# Patient Record
Sex: FEMALE
[De-identification: names, ages, dates, MRNs, and addresses within clinical notes are randomized per-mention and may not be internally consistent; named-entity substitution may affect disease eponyms.]

---

## 2020-10-01 ENCOUNTER — APPOINTMENT (OUTPATIENT)
Dept: OBGYN | Facility: CLINIC | Age: 45
End: 2020-10-01
Payer: MEDICAID

## 2020-10-01 ENCOUNTER — TRANSCRIPTION ENCOUNTER (OUTPATIENT)
Age: 45
End: 2020-10-01

## 2020-10-01 VITALS
DIASTOLIC BLOOD PRESSURE: 80 MMHG | WEIGHT: 133.44 LBS | BODY MASS INDEX: 24.56 KG/M2 | HEIGHT: 62 IN | SYSTOLIC BLOOD PRESSURE: 120 MMHG | TEMPERATURE: 97.6 F

## 2020-10-01 DIAGNOSIS — R61 GENERALIZED HYPERHIDROSIS: ICD-10-CM

## 2020-10-01 PROCEDURE — 36415 COLL VENOUS BLD VENIPUNCTURE: CPT

## 2020-10-01 PROCEDURE — 99386 PREV VISIT NEW AGE 40-64: CPT

## 2020-10-01 NOTE — PHYSICAL EXAM
[Appropriately responsive] : appropriately responsive [Alert] : alert [No Acute Distress] : no acute distress [No Lymphadenopathy] : no lymphadenopathy [Soft] : soft [Non-tender] : non-tender [Non-distended] : non-distended [No HSM] : No HSM [No Lesions] : no lesions [No Mass] : no mass [Oriented x3] : oriented x3 [Examination Of The Breasts] : a normal appearance [No Discharge] : no discharge [No Masses] : no breast masses were palpable [Labia Majora] : normal [Labia Minora] : normal [Normal] : normal [Uterine Adnexae] : normal [FreeTextEntry4] : no abnormal discharge

## 2020-10-01 NOTE — DISCUSSION/SUMMARY
[FreeTextEntry1] : 46 yo for well woman exam. h/o recent night sweats and vaginal odor.\par 1) Health maintenance: \par Pap + HPV\par STI testing\par Mammogram referral given\par 2) Night sweats: possible perimenopausal symptoms, recommend day 3 FSH to evaluate further\par 3) Vaginal odor: \par Affirm test, will contact with results\par

## 2020-10-01 NOTE — HISTORY OF PRESENT ILLNESS
[Patient reported mammogram was normal] : Patient reported mammogram was normal [Patient reported PAP Smear was normal] : Patient reported PAP Smear was normal [Y] : Patient is sexually active [N] : Patient denies prior pregnancies [Mammogramdate] : 2018 [PapSmeardate] : 2018  [PGHxTotal] : 0 [Regular Cycle Intervals] : periods have been regular [FreeTextEntry1] : 9/27/20 [Currently Active] : currently active [No] : No

## 2020-10-02 LAB
HIV1+2 AB SPEC QL IA.RAPID: NONREACTIVE
T PALLIDUM AB SER QL IA: NEGATIVE

## 2020-10-05 LAB
C TRACH RRNA SPEC QL NAA+PROBE: NOT DETECTED
CANDIDA VAG CYTO: NOT DETECTED
G VAGINALIS+PREV SP MTYP VAG QL MICRO: DETECTED
HBV SURFACE AG SER QL: NONREACTIVE
HCV AB SER QL: NONREACTIVE
HCV S/CO RATIO: 0.09 S/CO
HPV HIGH+LOW RISK DNA PNL CVX: NOT DETECTED
N GONORRHOEA RRNA SPEC QL NAA+PROBE: NOT DETECTED
SOURCE TP AMPLIFICATION: NORMAL
T VAGINALIS VAG QL WET PREP: NOT DETECTED

## 2020-10-08 LAB — CYTOLOGY CVX/VAG DOC THIN PREP: NORMAL

## 2020-10-19 DIAGNOSIS — N97.9 FEMALE INFERTILITY, UNSPECIFIED: ICD-10-CM

## 2020-10-23 LAB
ESTRADIOL SERPL-MCNC: 173 PG/ML
FSH SERPL-MCNC: 3.5 IU/L

## 2020-11-24 ENCOUNTER — RX RENEWAL (OUTPATIENT)
Age: 45
End: 2020-11-24

## 2020-12-21 ENCOUNTER — TRANSCRIPTION ENCOUNTER (OUTPATIENT)
Age: 45
End: 2020-12-21

## 2021-04-26 ENCOUNTER — APPOINTMENT (OUTPATIENT)
Dept: OBGYN | Facility: CLINIC | Age: 46
End: 2021-04-26
Payer: MEDICAID

## 2021-04-26 DIAGNOSIS — N89.8 OTHER SPECIFIED NONINFLAMMATORY DISORDERS OF VAGINA: ICD-10-CM

## 2021-04-26 PROCEDURE — 99213 OFFICE O/P EST LOW 20 MIN: CPT | Mod: 95

## 2021-04-26 NOTE — HISTORY OF PRESENT ILLNESS
[FreeTextEntry1] : Televisit today. Pt c/o vaginal odor and intermittent vaginal itching, symptoms similar to when she had BV. Denies vaginal discharge or vaginal burning. Denies urinary symptoms. No change in sexual partners.

## 2021-04-26 NOTE — DISCUSSION/SUMMARY
[FreeTextEntry1] : 46 yo with vaginal odor and itching. \par -Treat for presumed BV, pt would like metrogel, Rx sent to pharmacy, instructed on use\par -Discussed need to return for in-person visit if no improvement in symptoms for further evaluation\par -All questions answered

## 2021-08-06 ENCOUNTER — TRANSCRIPTION ENCOUNTER (OUTPATIENT)
Age: 46
End: 2021-08-06

## 2021-10-28 ENCOUNTER — APPOINTMENT (OUTPATIENT)
Dept: OBGYN | Facility: CLINIC | Age: 46
End: 2021-10-28
Payer: MEDICAID

## 2021-10-28 VITALS
BODY MASS INDEX: 24.48 KG/M2 | SYSTOLIC BLOOD PRESSURE: 118 MMHG | WEIGHT: 133 LBS | RESPIRATION RATE: 16 BRPM | HEIGHT: 62 IN | TEMPERATURE: 96.6 F | DIASTOLIC BLOOD PRESSURE: 78 MMHG

## 2021-10-28 DIAGNOSIS — Z12.39 ENCOUNTER FOR OTHER SCREENING FOR MALIGNANT NEOPLASM OF BREAST: ICD-10-CM

## 2021-10-28 DIAGNOSIS — N89.8 OTHER SPECIFIED NONINFLAMMATORY DISORDERS OF VAGINA: ICD-10-CM

## 2021-10-28 DIAGNOSIS — Z01.419 ENCOUNTER FOR GYNECOLOGICAL EXAMINATION (GENERAL) (ROUTINE) W/OUT ABNORMAL FINDINGS: ICD-10-CM

## 2021-10-28 DIAGNOSIS — L65.9 NONSCARRING HAIR LOSS, UNSPECIFIED: ICD-10-CM

## 2021-10-28 PROCEDURE — 99396 PREV VISIT EST AGE 40-64: CPT

## 2021-10-28 RX ORDER — METRONIDAZOLE 7.5 MG/G
0.75 GEL VAGINAL
Qty: 1 | Refills: 0 | Status: DISCONTINUED | COMMUNITY
Start: 2020-12-21 | End: 2021-10-28

## 2021-10-28 RX ORDER — METRONIDAZOLE 500 MG/1
500 TABLET ORAL TWICE DAILY
Qty: 14 | Refills: 0 | Status: DISCONTINUED | COMMUNITY
Start: 2020-10-05 | End: 2021-10-28

## 2021-10-28 NOTE — PHYSICAL EXAM
[Appropriately responsive] : appropriately responsive [Alert] : alert [No Acute Distress] : no acute distress [No Lymphadenopathy] : no lymphadenopathy [Soft] : soft [Non-tender] : non-tender [Non-distended] : non-distended [No HSM] : No HSM [No Lesions] : no lesions [No Mass] : no mass [Oriented x3] : oriented x3 [Examination Of The Breasts] : a normal appearance [No Discharge] : no discharge [No Masses] : no breast masses were palpable [Labia Majora] : normal [Labia Minora] : normal [Normal] : normal [Uterine Adnexae] : normal [Tenderness] : nontender [Enlarged ___ wks] : not enlarged [FreeTextEntry4] : small amount thin white discharge

## 2021-10-28 NOTE — DISCUSSION/SUMMARY
[FreeTextEntry1] : 45 yo for well woman exam, intermittent vaginal odor.\par 1) Health maintenance: \par Pap + HPV due 2025\par STI testing declined\par Mammogram referral given\par \par 2) Vaginal odor: \par Affirm test, will contact with results. If positive for BV, consider prolonged treatment course\par

## 2021-10-28 NOTE — HISTORY OF PRESENT ILLNESS
[Patient reported mammogram was normal] : Patient reported mammogram was normal [Patient reported PAP Smear was normal] : Patient reported PAP Smear was normal [N] : Patient denies prior pregnancies [Y] : Patient is sexually active [Monogamous (Male Partner)] : is monogamous with a male partner [Mammogramdate] : 2020 [PapSmeardate] : 2020 [TextBox_31] : due 2025 [PGHxTotal] : 0 [FreeTextEntry1] : Denies hx of cysts, fibroids, STI. Hx of abnormal pap years ago and CIN1 on colposcopy, resolved.

## 2021-11-08 ENCOUNTER — NON-APPOINTMENT (OUTPATIENT)
Age: 46
End: 2021-11-08

## 2021-11-08 LAB
CANDIDA VAG CYTO: NOT DETECTED
G VAGINALIS+PREV SP MTYP VAG QL MICRO: DETECTED
T VAGINALIS VAG QL WET PREP: NOT DETECTED

## 2021-12-17 ENCOUNTER — NON-APPOINTMENT (OUTPATIENT)
Age: 46
End: 2021-12-17

## 2022-06-20 ENCOUNTER — NON-APPOINTMENT (OUTPATIENT)
Age: 47
End: 2022-06-20

## 2022-07-13 ENCOUNTER — APPOINTMENT (OUTPATIENT)
Dept: OBGYN | Facility: CLINIC | Age: 47
End: 2022-07-13

## 2022-07-13 VITALS
HEIGHT: 62 IN | WEIGHT: 127 LBS | SYSTOLIC BLOOD PRESSURE: 118 MMHG | BODY MASS INDEX: 23.37 KG/M2 | DIASTOLIC BLOOD PRESSURE: 70 MMHG

## 2022-07-13 DIAGNOSIS — N94.10 UNSPECIFIED DYSPAREUNIA: ICD-10-CM

## 2022-07-13 DIAGNOSIS — N89.8 OTHER SPECIFIED NONINFLAMMATORY DISORDERS OF VAGINA: ICD-10-CM

## 2022-07-13 LAB
HCG UR QL: NEGATIVE
QUALITY CONTROL: YES

## 2022-07-13 PROCEDURE — 81025 URINE PREGNANCY TEST: CPT

## 2022-07-13 PROCEDURE — 99214 OFFICE O/P EST MOD 30 MIN: CPT

## 2022-07-13 NOTE — HISTORY OF PRESENT ILLNESS
[FreeTextEntry1] : 45 yo here for follow-up of recurrent BV. Used boric acid suppositories, used x 2 months. Uses summers lauren externally, has been using for years. Uses organic soap on rest of body. Uses Tide detergent. Cotton underwear on menses, otherwise does not wear underwear. Also c/o pain during intercourse, predominantly LLQ cramping, unrelated to position, similar to menstrual cramps. Has had long hx of dysmenorrhea, worse first 1-2 days, has to stay in bed, takes midol. No change in sexual partners. Also with heavy bleeding, x first 2-3 days, changing q2 hours with large pads. \par \par Labs 6 months ago with CBC and TSH normal.

## 2022-07-13 NOTE — PHYSICAL EXAM
[Chaperone Present] : A chaperone was present in the examining room during all aspects of the physical examination [FreeTextEntry1] : TAD Hernández [Appropriately responsive] : appropriately responsive [Alert] : alert [No Acute Distress] : no acute distress [No Lymphadenopathy] : no lymphadenopathy [Soft] : soft [Non-tender] : non-tender [Non-distended] : non-distended [No HSM] : No HSM [No Lesions] : no lesions [No Mass] : no mass [Oriented x3] : oriented x3 [Labia Majora] : normal [Labia Minora] : normal [Normal] : normal [Tenderness] : nontender [Enlarged ___ wks] : not enlarged [Uterine Adnexae] : normal [FreeTextEntry4] : small amount brownish discharge

## 2022-07-13 NOTE — DISCUSSION/SUMMARY
[FreeTextEntry1] : 45 yo with hx of recurrent BV now with vaginal odor. Dysmenorrhea, heavy menstrual bleeding. \par 1) Vaginal odor: \par Affirm\par GC/CT\par Will contact with results, consider restarting metronidazole x 7 days with twice weeky metronidazole, consider fluconazole given gets yeast infection after treatment\par \par 2) Dysmenorrhea/dyspareunia/heavy bleeding:\par Pelvic ultrasound to evaluate further\par \par Return after ultrasound for follow-up

## 2022-07-14 ENCOUNTER — APPOINTMENT (OUTPATIENT)
Dept: ULTRASOUND IMAGING | Facility: CLINIC | Age: 47
End: 2022-07-14

## 2022-07-14 ENCOUNTER — OUTPATIENT (OUTPATIENT)
Dept: OUTPATIENT SERVICES | Facility: HOSPITAL | Age: 47
LOS: 1 days | End: 2022-07-14
Payer: MEDICAID

## 2022-07-14 DIAGNOSIS — N94.6 DYSMENORRHEA, UNSPECIFIED: ICD-10-CM

## 2022-07-14 LAB
C TRACH RRNA SPEC QL NAA+PROBE: NOT DETECTED
N GONORRHOEA RRNA SPEC QL NAA+PROBE: NOT DETECTED
SOURCE AMPLIFICATION: NORMAL

## 2022-07-14 PROCEDURE — 76856 US EXAM PELVIC COMPLETE: CPT

## 2022-07-14 PROCEDURE — 76830 TRANSVAGINAL US NON-OB: CPT | Mod: 26

## 2022-07-14 PROCEDURE — 76830 TRANSVAGINAL US NON-OB: CPT

## 2022-07-14 PROCEDURE — 76856 US EXAM PELVIC COMPLETE: CPT | Mod: 26,59

## 2022-07-15 ENCOUNTER — NON-APPOINTMENT (OUTPATIENT)
Age: 47
End: 2022-07-15

## 2022-07-28 ENCOUNTER — APPOINTMENT (OUTPATIENT)
Dept: OBGYN | Facility: CLINIC | Age: 47
End: 2022-07-28

## 2022-07-28 DIAGNOSIS — B96.89 ACUTE VAGINITIS: ICD-10-CM

## 2022-07-28 DIAGNOSIS — N76.0 ACUTE VAGINITIS: ICD-10-CM

## 2022-07-28 LAB
BILIRUB UR QL STRIP: NORMAL
CLARITY UR: CLEAR
COLLECTION METHOD: NORMAL
GLUCOSE UR-MCNC: NORMAL
HCG UR QL: 0.2 EU/DL
HGB UR QL STRIP.AUTO: NORMAL
KETONES UR-MCNC: NORMAL
LEUKOCYTE ESTERASE UR QL STRIP: NORMAL
NITRITE UR QL STRIP: NORMAL
PH UR STRIP: 7
PROT UR STRIP-MCNC: NORMAL
SP GR UR STRIP: 1.01

## 2022-07-28 PROCEDURE — 99213 OFFICE O/P EST LOW 20 MIN: CPT | Mod: 95

## 2022-07-28 NOTE — REASON FOR VISIT
[Home] : at home, [unfilled] , at the time of the visit. [Medical Office: ([facility])___] : at the medical office located in  [Patient] : the patient [Self] : self [Follow-Up] : a follow-up evaluation of [FreeTextEntry2] : results

## 2022-07-28 NOTE — DISCUSSION/SUMMARY
[FreeTextEntry1] : 47 yo with recurrent bacterial vaginosis, dysmenorrhea, heavy menstrual bleeding.\par \par 1) recurrent bacterial vaginosis: Continue metronidazole, complete 7-day course and continue twice weekly for 4 to 6 months\par 2) Dysmenorrhea/heavy menstrual bleeding:\par –Continue conservative management\par – Medical and surgical options reviewed with patient, declines for now\par \par Return as needed

## 2022-07-28 NOTE — HISTORY OF PRESENT ILLNESS
[FreeTextEntry1] : Pt here for follow-up. Affirm with BV, started on Metronidazole with plan for twice weekly for recurrent BV. \par \par Pelvic ultrasound results: 1cm fundal fibroid, otherwise normal\par \par Pelvic ultrasound images reviewed myself and reviewed with pt. Discussed etiologies of dysmenorrhea/AUB including fibroids, adenomyosis, endometriosis. DIscussed options for management including expectant management, dietary modifications, yoga, exercise, NSAIDs, Lysteda,, oral contraceptives, patch, ring, Depo, Lupron, Orilissa, endometrial ablation, diagnostic laparoscopy with possible excision of endometriosis, hysterectomy.  Patient desires conservative management with dietary modifications yoga exercises and NSAIDs as needed

## 2022-10-31 ENCOUNTER — APPOINTMENT (OUTPATIENT)
Dept: OBGYN | Facility: CLINIC | Age: 47
End: 2022-10-31

## 2022-10-31 VITALS
RESPIRATION RATE: 16 BRPM | WEIGHT: 127 LBS | SYSTOLIC BLOOD PRESSURE: 118 MMHG | BODY MASS INDEX: 23.37 KG/M2 | HEIGHT: 62 IN | DIASTOLIC BLOOD PRESSURE: 70 MMHG

## 2022-10-31 DIAGNOSIS — Z01.419 ENCOUNTER FOR GYNECOLOGICAL EXAMINATION (GENERAL) (ROUTINE) W/OUT ABNORMAL FINDINGS: ICD-10-CM

## 2022-10-31 LAB
HCG UR QL: NEGATIVE
QUALITY CONTROL: YES

## 2022-10-31 PROCEDURE — 81025 URINE PREGNANCY TEST: CPT

## 2022-10-31 PROCEDURE — 99213 OFFICE O/P EST LOW 20 MIN: CPT | Mod: 25

## 2022-10-31 PROCEDURE — 99396 PREV VISIT EST AGE 40-64: CPT

## 2022-10-31 RX ORDER — .BETA.-CAROTENE, ASCORBIC ACID, CHOLECALCIFEROL, .ALPHA.-TOCOPHEROL ACETATE, DL-, THIAMINE MONONITRATE, RIBOFLAVIN, NIACINAMIDE, PYRIDOXINE HYDROCHLORIDE, FOLIC ACID, CYANOCOBALAMIN, CALCIUM PANTOTHENATE, CALCIUM CARBONATE, FERROUS FUMARATE, ZINC OXIDE, AND DOCUSATE SODIUM 1000; 100; 400; 30; 3; 3; 15; 20; 1; 12; 7; 200; 29; 20; 25 [IU]/1; MG/1; [IU]/1; [IU]/1; MG/1; MG/1; MG/1; MG/1; MG/1; UG/1; MG/1; MG/1; MG/1; MG/1; MG/1
29-1 TABLET, COATED ORAL DAILY
Qty: 90 | Refills: 3 | Status: DISCONTINUED | COMMUNITY
Start: 2020-10-01 | End: 2022-10-31

## 2022-10-31 RX ORDER — FLUCONAZOLE 150 MG/1
150 TABLET ORAL DAILY
Qty: 4 | Refills: 0 | Status: DISCONTINUED | COMMUNITY
Start: 2022-07-15 | End: 2022-10-31

## 2022-10-31 RX ORDER — METRONIDAZOLE 500 MG/1
500 TABLET ORAL
Qty: 20 | Refills: 0 | Status: DISCONTINUED | COMMUNITY
Start: 2022-07-15 | End: 2022-10-31

## 2022-10-31 RX ORDER — D-METHORPHAN/PE/ACETAMINOPHEN 10-5-325MG
28-0.8 & 44 CAPSULE ORAL
Qty: 90 | Refills: 3 | Status: DISCONTINUED | COMMUNITY
Start: 2020-10-29 | End: 2022-10-31

## 2022-10-31 RX ORDER — METRONIDAZOLE 500 MG/1
500 TABLET ORAL TWICE DAILY
Qty: 14 | Refills: 0 | Status: DISCONTINUED | COMMUNITY
Start: 2021-10-30 | End: 2022-10-31

## 2022-10-31 RX ORDER — METRONIDAZOLE 7.5 MG/G
0.75 GEL VAGINAL
Qty: 1 | Refills: 2 | Status: DISCONTINUED | COMMUNITY
Start: 2021-10-30 | End: 2022-10-31

## 2022-10-31 NOTE — DISCUSSION/SUMMARY
[FreeTextEntry1] : 48yo for well woman exam, abnormal uterine bleeding\par 1) Health maintenance: \par Pap + HPV due 2025\par STI testing declined\par Mammogram done today, awaiting results\par \par 2) New onset AUB in addition to heavy menstrual bleeding/dysmenorrhea, unclear etiology: \par Recent ultrasound July normal, 1cm fibroid\par Day 3 testing to evaluate further, including cbc, cmp, tsh\par Monitor menses over next 2 months\par If continues discussed needs EMBx/hysteroscopy for further evaluation\par \par Return in 2 months for follow-up

## 2022-10-31 NOTE — HISTORY OF PRESENT ILLNESS
[Patient reported mammogram was normal] : Patient reported mammogram was normal [Patient reported breast sonogram was normal] : Patient reported breast sonogram was normal [Patient reported PAP Smear was normal] : Patient reported PAP Smear was normal [Y] : Patient is sexually active [Monogamous (Male Partner)] : is monogamous with a male partner [N] : Patient denies prior pregnancies [TextBox_4] : 8/31-9/5\par 9/14-9/19\par 9/28-10/3\par 10/24-10/30\par \par Menses continues to be heavy and painful. c/o feeling hot all of the time, has felt similar for many years.  [Mammogramdate] : 2021 [TextBox_19] : repeated today, awaiting results [BreastSonogramDate] : 2021 [PapSmeardate] : 2020 [TextBox_31] : due 2025 [LMPDate] : 10/24/22 [MensesFreq] : irreg [MensesAmount] : heavy [PGHxTotal] : 0 [FreeTextEntry1] : Denies hx of cysts, fibroids, STI. Hx of abnormal pap years ago and CIN1 on colposcopy, resolved.

## 2022-10-31 NOTE — PHYSICAL EXAM
[Chaperone Present] : A chaperone was present in the examining room during all aspects of the physical examination [Appropriately responsive] : appropriately responsive [Alert] : alert [No Acute Distress] : no acute distress [No Lymphadenopathy] : no lymphadenopathy [Oriented x3] : oriented x3 [FreeTextEntry1] : TAD Lemus  [Examination Of The Breasts] : a normal appearance [No Discharge] : no discharge [No Masses] : no breast masses were palpable [Labia Majora] : normal [Labia Minora] : normal [Normal] : normal [Tenderness] : nontender [Enlarged ___ wks] : not enlarged [Uterine Adnexae] : normal

## 2022-11-03 ENCOUNTER — APPOINTMENT (OUTPATIENT)
Dept: OBGYN | Facility: CLINIC | Age: 47
End: 2022-11-03

## 2022-12-30 ENCOUNTER — NON-APPOINTMENT (OUTPATIENT)
Age: 47
End: 2022-12-30

## 2023-01-04 ENCOUNTER — APPOINTMENT (OUTPATIENT)
Dept: OBGYN | Facility: CLINIC | Age: 48
End: 2023-01-04
Payer: MEDICAID

## 2023-01-04 DIAGNOSIS — N92.0 EXCESSIVE AND FREQUENT MENSTRUATION WITH REGULAR CYCLE: ICD-10-CM

## 2023-01-04 PROCEDURE — 99213 OFFICE O/P EST LOW 20 MIN: CPT | Mod: 95

## 2023-01-04 NOTE — DISCUSSION/SUMMARY
[FreeTextEntry1] : 46 yo with worsening AUB/dysmenorrhea. \par -Day 3 labs tomorrow, will contact for follow-up\par -Options for management reviewed, pt would like to await lab results prior to further discussion\par -Recommend office hysteroscopy/EMBx, will review after results

## 2023-01-04 NOTE — HISTORY OF PRESENT ILLNESS
[FreeTextEntry1] : Pt here for follow-up. Continues to have heavy prolonged bleeding and painful menses. \par 10/24-10/30\par 11/27-12/6\par Menses started 1/3. \par \par Has not had day 3 labs yet, plans to go tomorrow. \par \par Discussed recommendation for hysteroscopy/EMBx, procedure reviewed. Pt would like to await lab results first. \par

## 2023-01-04 NOTE — PHYSICAL EXAM
[Chaperone Present] : A chaperone was present in the examining room during all aspects of the physical examination [FreeTextEntry1] : TAD Sosa [Appropriately responsive] : appropriately responsive [Alert] : alert [No Acute Distress] : no acute distress

## 2023-01-04 NOTE — REASON FOR VISIT
[Follow-Up] : a follow-up evaluation of [Abnormal Uterine Bleeding] : abnormal uterine bleeding [Home] : at home, [unfilled] , at the time of the visit. [Medical Office: ([facility])___] : at the medical office located in  [FreeTextEntry2] : bleeding, dysmenorrhea

## 2023-05-26 ENCOUNTER — NON-APPOINTMENT (OUTPATIENT)
Age: 48
End: 2023-05-26

## 2023-10-24 ENCOUNTER — NON-APPOINTMENT (OUTPATIENT)
Age: 48
End: 2023-10-24

## 2023-10-27 ENCOUNTER — APPOINTMENT (OUTPATIENT)
Dept: OBGYN | Facility: CLINIC | Age: 48
End: 2023-10-27
Payer: MEDICAID

## 2023-10-27 VITALS
HEIGHT: 62 IN | BODY MASS INDEX: 23.37 KG/M2 | DIASTOLIC BLOOD PRESSURE: 82 MMHG | WEIGHT: 127 LBS | SYSTOLIC BLOOD PRESSURE: 160 MMHG

## 2023-10-27 DIAGNOSIS — Z01.419 ENCOUNTER FOR GYNECOLOGICAL EXAMINATION (GENERAL) (ROUTINE) W/OUT ABNORMAL FINDINGS: ICD-10-CM

## 2023-10-27 DIAGNOSIS — Z11.3 ENCOUNTER FOR SCREENING FOR INFECTIONS WITH A PREDOMINANTLY SEXUAL MODE OF TRANSMISSION: ICD-10-CM

## 2023-10-27 DIAGNOSIS — N89.8 OTHER SPECIFIED NONINFLAMMATORY DISORDERS OF VAGINA: ICD-10-CM

## 2023-10-27 PROCEDURE — 99396 PREV VISIT EST AGE 40-64: CPT

## 2023-11-01 DIAGNOSIS — B37.31 ACUTE CANDIDIASIS OF VULVA AND VAGINA: ICD-10-CM

## 2023-11-01 DIAGNOSIS — B96.89 ACUTE VAGINITIS: ICD-10-CM

## 2023-11-01 DIAGNOSIS — N76.0 ACUTE VAGINITIS: ICD-10-CM

## 2023-11-01 RX ORDER — FLUCONAZOLE 150 MG/1
150 TABLET ORAL
Qty: 2 | Refills: 0 | Status: ACTIVE | COMMUNITY
Start: 2023-11-01 | End: 1900-01-01

## 2023-11-01 RX ORDER — METRONIDAZOLE 7.5 MG/G
0.75 GEL VAGINAL
Qty: 1 | Refills: 0 | Status: ACTIVE | COMMUNITY
Start: 2023-11-01 | End: 1900-01-01

## 2023-11-07 DIAGNOSIS — R92.8 OTHER ABNORMAL AND INCONCLUSIVE FINDINGS ON DIAGNOSTIC IMAGING OF BREAST: ICD-10-CM

## 2023-11-09 LAB
A VAGINAE DNA VAG QL NAA+PROBE: ABNORMAL
BVAB2 DNA VAG QL NAA+PROBE: ABNORMAL
C KRUSEI DNA VAG QL NAA+PROBE: NEGATIVE
C TRACH RRNA SPEC QL NAA+PROBE: NEGATIVE
C TRACH RRNA SPEC QL NAA+PROBE: NOT DETECTED
CANDIDA DNA VAG QL NAA+PROBE: NEGATIVE
CYTOLOGY CVX/VAG DOC THIN PREP: ABNORMAL
HPV HIGH+LOW RISK DNA PNL CVX: NOT DETECTED
MEGA1 DNA VAG QL NAA+PROBE: ABNORMAL
N GONORRHOEA RRNA SPEC QL NAA+PROBE: NEGATIVE
N GONORRHOEA RRNA SPEC QL NAA+PROBE: NOT DETECTED
SOURCE TP AMPLIFICATION: NORMAL
T VAGINALIS RRNA SPEC QL NAA+PROBE: NEGATIVE

## 2023-11-22 ENCOUNTER — TRANSCRIPTION ENCOUNTER (OUTPATIENT)
Age: 48
End: 2023-11-22

## 2024-01-16 ENCOUNTER — APPOINTMENT (OUTPATIENT)
Dept: OBGYN | Facility: CLINIC | Age: 49
End: 2024-01-16
Payer: MEDICAID

## 2024-01-16 VITALS
BODY MASS INDEX: 24.29 KG/M2 | SYSTOLIC BLOOD PRESSURE: 142 MMHG | WEIGHT: 132 LBS | DIASTOLIC BLOOD PRESSURE: 80 MMHG | HEIGHT: 62 IN

## 2024-01-16 LAB
BILIRUB UR QL STRIP: NORMAL
CLARITY UR: NORMAL
COLLECTION METHOD: NORMAL
GLUCOSE UR-MCNC: NORMAL
HCG UR QL: 0.2 EU/DL
HCG UR QL: NEGATIVE
HGB UR QL STRIP.AUTO: ABNORMAL
KETONES UR-MCNC: ABNORMAL
LEUKOCYTE ESTERASE UR QL STRIP: NORMAL
NITRITE UR QL STRIP: NORMAL
PH UR STRIP: 5
PROT UR STRIP-MCNC: NORMAL
QUALITY CONTROL: YES
SP GR UR STRIP: 1.03

## 2024-01-16 PROCEDURE — 99214 OFFICE O/P EST MOD 30 MIN: CPT | Mod: 25

## 2024-01-16 PROCEDURE — 81003 URINALYSIS AUTO W/O SCOPE: CPT | Mod: QW

## 2024-01-16 PROCEDURE — 81025 URINE PREGNANCY TEST: CPT

## 2024-01-16 PROCEDURE — 99459 PELVIC EXAMINATION: CPT | Mod: 25

## 2024-01-16 NOTE — REASON FOR VISIT
[Follow-Up] : a follow-up evaluation of [FreeTextEntry2] : discuss results. Pt also c/o vaginal discharge, odor, and AUB

## 2024-01-16 NOTE — DISCUSSION/SUMMARY
[FreeTextEntry1] : Alexus is coming in for AUB and dysmenorrhea.  She has long standing AUB, started workup but did not complete it.  I reviewed with Alexus her symptoms and concerns. Discussed with the patient definition of abnormal uterine bleeding: abnormal quantity, duration, or schedule of bleeding. Discussed etiologies for AUB: 1. Structural uterine pathologies such as fibroids, polyps, adenomyosis vascular malformations, other uterine malformations, or neoplasia. 2. Non uterine causes such as ovulatory dysfunction, bleeding disorders, medications, cervical disorders.  Discussed evaluation includin. Pregnancy test 2. CBC, ferritin, TIBC to evaluate for anemia 3. Thyroid evaluation 4. Pelvic US to evaluate for fibroids, adenomyosis, polyps, uterine AV malformations, with possible Saline hysterography if needed 5. Endometrial sampling after sonogram evaluation - EMB or hysteroscopic EMC 6. Possible evaluation for bleeding disorders  Discussed that workup is aimed at both finding the etiology of AUB and ruling out diagnoses such is hyperplasia or uterine malignancy. Referral for pelvic transvaginal US given. labs drawn.  Alexus will follow up for results and discussion on next steps.

## 2024-01-16 NOTE — HISTORY OF PRESENT ILLNESS
[FreeTextEntry1] : Constance is coming in for AUB and foul smell. History of abnormal bleeding for the past 18 months.  She had a sonogram in 7/2022 that was normal. Reports menstruation is usually 7 days, sometimes will be longer, up to 14 days.  She reports she reports she soaks through several pads/day, passing clots up to 5cm. Reports accidents with menses. Reports nausea with menstruation as well as severe dysmenorrhea and missing days from work.   Reports normal menstruation previously, with only dysmenorrhea.  She reports she had foul vaginal smell and she thinks she had a yeast infection that she treated.

## 2024-01-16 NOTE — PHYSICAL EXAM
[FreeTextEntry1] :  Dinorah Alfaro [Appropriately responsive] : appropriately responsive [Alert] : alert [No Acute Distress] : no acute distress [Oriented x3] : oriented x3 [Labia Majora] : normal [Labia Minora] : normal [Normal] : normal [FreeTextEntry4] : No discharge noted, no foul smell noted.

## 2024-01-17 LAB
BASOPHILS # BLD AUTO: 0.05 K/UL
BASOPHILS NFR BLD AUTO: 0.6 %
EOSINOPHIL # BLD AUTO: 0.35 K/UL
EOSINOPHIL NFR BLD AUTO: 4.5 %
FERRITIN SERPL-MCNC: 76 NG/ML
HCG SERPL-MCNC: <1 MIU/ML
HCT VFR BLD CALC: 44.4 %
HGB BLD-MCNC: 14.7 G/DL
IMM GRANULOCYTES NFR BLD AUTO: 0.4 %
IRON SATN MFR SERPL: 15 %
IRON SERPL-MCNC: 64 UG/DL
LYMPHOCYTES # BLD AUTO: 2.09 K/UL
LYMPHOCYTES NFR BLD AUTO: 26.8 %
MAN DIFF?: NORMAL
MCHC RBC-ENTMCNC: 29.8 PG
MCHC RBC-ENTMCNC: 33.1 GM/DL
MCV RBC AUTO: 89.9 FL
MONOCYTES # BLD AUTO: 0.58 K/UL
MONOCYTES NFR BLD AUTO: 7.4 %
NEUTROPHILS # BLD AUTO: 4.71 K/UL
NEUTROPHILS NFR BLD AUTO: 60.3 %
PLATELET # BLD AUTO: 366 K/UL
RBC # BLD: 4.94 M/UL
RBC # FLD: 13.3 %
TIBC SERPL-MCNC: 428 UG/DL
TSH SERPL-ACNC: 1.16 UIU/ML
UIBC SERPL-MCNC: 363 UG/DL
WBC # FLD AUTO: 7.81 K/UL

## 2024-01-18 ENCOUNTER — TRANSCRIPTION ENCOUNTER (OUTPATIENT)
Age: 49
End: 2024-01-18

## 2024-01-29 ENCOUNTER — APPOINTMENT (OUTPATIENT)
Dept: OBGYN | Facility: CLINIC | Age: 49
End: 2024-01-29
Payer: MEDICAID

## 2024-01-29 DIAGNOSIS — Z71.2 PERSON CONSULTING FOR EXPLANATION OF EXAMINATION OR TEST FINDINGS: ICD-10-CM

## 2024-01-29 DIAGNOSIS — Z71.89 OTHER SPECIFIED COUNSELING: ICD-10-CM

## 2024-01-29 PROCEDURE — 99214 OFFICE O/P EST MOD 30 MIN: CPT | Mod: 95

## 2024-01-29 NOTE — DISCUSSION/SUMMARY
[FreeTextEntry1] : I reviewed the images of the ultrasound myself. I agree overall with the assessment of ultrasound with simple cyst and finding of endometrium about 5-6 millimeters. I reviewed these findings with Constance and we discussed workup of abnormal bleeding. The purpose of workup for abnormal bleeding is to rule out hyperplasia or malignancy. We discussed options includin.  Endometrial biopsy in the office 2.  Hysteroscopy with endometrial biopsy in the office 3.  Hysteroscopy with D&C in the operating room. Discussed that endometrial biopsy alone in the office obtains may be 10% of the endometrium, thus not recommended for tissue diagnosis alone.  Discussed that adding hysteroscopy to the procedure allows visualization of the cavity and any abnormality. Discussed that endometrial curettings with hysteroscopy in the operating room will allow for better tissue diagnosis but has higher risks.  Given overall normal ultrasound, I discussed with her following up with hysteroscopy with endometrial biopsy in the office. She is amenable to proceed with office procedure. She will schedule an appointment. All her questions were answered.  I recommend following up with a pelvic ultrasound in 6-12 weeks to assess resolution of simple ovarian cyst.

## 2024-01-29 NOTE — HISTORY OF PRESENT ILLNESS
[FreeTextEntry1] : Constance has a telehealth to review results and discuss management.  Due to connectivity issues this was converted to a phone conversation History of AUB for the past 18 months. Sonogram in 7/2022 normal.  She continues to have AUB, bleeding up to 14 days with heavy bleeding and severe dysmenorrhea.  Sonogram done this month shows a 7.2X4.2X3.2cm uterus, normal, endometrium 4mm. Right ovary normal, left ovary measures 3.6X6.5X2.5cm with a simple left ovarian cyst 3.5X2.8X3.1cm. no free fluid in the pelvis.

## 2024-02-07 DIAGNOSIS — N93.9 ABNORMAL UTERINE AND VAGINAL BLEEDING, UNSPECIFIED: ICD-10-CM

## 2024-02-08 ENCOUNTER — APPOINTMENT (OUTPATIENT)
Dept: OBGYN | Facility: CLINIC | Age: 49
End: 2024-02-08
Payer: MEDICAID

## 2024-02-08 VITALS
BODY MASS INDEX: 24.29 KG/M2 | SYSTOLIC BLOOD PRESSURE: 128 MMHG | DIASTOLIC BLOOD PRESSURE: 82 MMHG | HEIGHT: 62 IN | WEIGHT: 132 LBS

## 2024-02-08 DIAGNOSIS — N94.6 DYSMENORRHEA, UNSPECIFIED: ICD-10-CM

## 2024-02-08 DIAGNOSIS — N93.9 ABNORMAL UTERINE AND VAGINAL BLEEDING, UNSPECIFIED: ICD-10-CM

## 2024-02-08 LAB
HCG UR QL: NEGATIVE
QUALITY CONTROL: YES

## 2024-02-08 PROCEDURE — 99212 OFFICE O/P EST SF 10 MIN: CPT | Mod: 25

## 2024-02-08 PROCEDURE — 81025 URINE PREGNANCY TEST: CPT

## 2024-02-08 PROCEDURE — 99459 PELVIC EXAMINATION: CPT | Mod: 25

## 2024-02-08 PROCEDURE — 58558Z: CUSTOM

## 2024-02-08 NOTE — HISTORY OF PRESENT ILLNESS
[FreeTextEntry1] : Constance is coming for hysteroscopy with endometrial biopsy. Abnormal uterine bleeding up to 14 days as well as severe dysmenorrhea. Endometrium and my assessment was 5-6mm.

## 2024-02-08 NOTE — PROCEDURE
[Hysteroscopy] : Hysteroscopy [Abnormal uterine bleeding] : abnormal uterine bleeding [Endometrial Biopsy] : Endometrial biopsy [Time out performed] : Pre-procedure time out performed.  Patient's name, date of birth and procedure confirmed. [Consent Obtained] : Consent obtained [Risks] : risks [Benefits] : benefits [Alternatives] : alternatives [Patient] : patient [Infection] : infection [Bleeding] : bleeding [Allergic Reaction] : allergic reaction [Uterine Perforation] : uterine perforation [Pain] : pain [Negative] : negative pregnancy test [Betadine] : Betadine [None] : none [Easy Passage] : Easy passage [Sounded to ___ cm] : sounded to [unfilled] ~Ucm [Anteverted] : anteverted [Moderate] : moderate [Specimen Collected] : collected [Sent to Pathology] : placed in buffered formalin and sent for pathology [Tolerated Well] : Patient tolerated the procedure well [No Complications] : No complications [rigid] : Using aseptic technique a hysteroscopy was performed using a rigid hysteroscope [Antibiotics given] : antibiotics not given [Hemostasis obtained] : hemostasis obtained [Aftercare instructions/regstrictions given and follow-up scheduled] : Aftercare instructions/restrictions given and follow-up scheduled [de-identified] : Abnormal uterine bleeding [de-identified] : Normal endometrial cavity, bilateral ostia identified. No polyps or other masses within the endometrial cavity or cervix. [de-identified] : EMB

## 2024-02-08 NOTE — PHYSICAL EXAM
[Chaperone Present] : A chaperone was present in the examining room during all aspects of the physical examination [FreeTextEntry1] :  Dinorah Alfaro [Appropriately responsive] : appropriately responsive [Alert] : alert [No Acute Distress] : no acute distress [Oriented x3] : oriented x3 [Labia Majora] : normal [Labia Minora] : normal [Normal] : normal

## 2024-02-08 NOTE — DISCUSSION/SUMMARY
[FreeTextEntry1] : I reviewed with Constance her symptoms. I reviewed with her the procedure in detail including positioning, placement of speculum, insertion of camera through the cervix into the endometrial cavity, inspection of the endometrial cavity, EMB. We reviewed the risks including, but not limited to infection, bleeding, uterine perforation with need for laparoscopy/laparotomy/hysterectomy.  Constance signed on consent. Uneventful procedure with normal endometrial cavity noted.  EMB was performed without complications. Follow-up with results.

## 2024-02-26 ENCOUNTER — NON-APPOINTMENT (OUTPATIENT)
Age: 49
End: 2024-02-26

## 2024-02-29 ENCOUNTER — NON-APPOINTMENT (OUTPATIENT)
Age: 49
End: 2024-02-29

## 2024-02-29 LAB — CORE LAB BIOPSY: NORMAL
